# Patient Record
Sex: FEMALE | Race: WHITE
[De-identification: names, ages, dates, MRNs, and addresses within clinical notes are randomized per-mention and may not be internally consistent; named-entity substitution may affect disease eponyms.]

---

## 2016-06-03 VITALS
DIASTOLIC BLOOD PRESSURE: 66 MMHG | DIASTOLIC BLOOD PRESSURE: 66 MMHG | DIASTOLIC BLOOD PRESSURE: 66 MMHG | DIASTOLIC BLOOD PRESSURE: 66 MMHG | SYSTOLIC BLOOD PRESSURE: 136 MMHG | SYSTOLIC BLOOD PRESSURE: 136 MMHG | SYSTOLIC BLOOD PRESSURE: 136 MMHG | DIASTOLIC BLOOD PRESSURE: 66 MMHG | SYSTOLIC BLOOD PRESSURE: 136 MMHG | SYSTOLIC BLOOD PRESSURE: 136 MMHG

## 2017-08-30 ENCOUNTER — HOSPITAL ENCOUNTER (OUTPATIENT)
Dept: HOSPITAL 61 - MAMMO | Age: 67
Discharge: HOME | End: 2017-08-30
Attending: FAMILY MEDICINE
Payer: COMMERCIAL

## 2017-08-30 DIAGNOSIS — Z12.31: Primary | ICD-10-CM

## 2017-08-31 NOTE — RAD
DATE: 08/30/17



EXAM: DIGITAL SCREEN BILAT W/CAD



HISTORY: Routine screening



COMPARISON: None available. Reportedly patient's previous mammogram was 
performed more than 1012 years ago. This is a new baseline exam.



This study was interpreted with the benefit of Computerized Aided Detection (CAD
).



TECHNIQUE: Routine digital CC and MLO views of both breasts are obtained.



FINDINGS:



Breast Density: B



No suspicious clustered microcalcifications, focal asymmetric densities or 
masses are seen. The skin and nipples are intact. 



IMPRESSION: Negative exam



BI-RADS CATEGORY:BIRDS 1 (NEGATIVE EXAM)

RECOMMENDATION: ROUTINE SCREENING MAMMOGRAPHY, AS PER ACR GUIDELINES.



RECOMMENDED FOLLOW-UP: One year



PQRS compliance statement: Patient information was entered into a reminder 
system with a target due date for the next mammogram.



Mammography is a sensitive method for finding small breast cancers, but it does 
not detect them all and is not a substitute for careful clinical examination. A 
negative mammogram does not negate a clinically suspicious finding and should 
not result in delay in biopsying a clinically suspicious abnormality.



"Our facility is accredited by the [employer] Mammography 
Program."
[email or web address]

## 2018-01-02 ENCOUNTER — HOSPITAL ENCOUNTER (OUTPATIENT)
Dept: HOSPITAL 61 - CT | Age: 68
Discharge: HOME | End: 2018-01-02
Attending: FAMILY MEDICINE
Payer: COMMERCIAL

## 2018-01-02 DIAGNOSIS — R16.0: ICD-10-CM

## 2018-01-02 DIAGNOSIS — Z90.49: ICD-10-CM

## 2018-01-02 DIAGNOSIS — N28.1: Primary | ICD-10-CM

## 2018-01-02 DIAGNOSIS — K57.30: ICD-10-CM

## 2018-01-02 DIAGNOSIS — K83.8: ICD-10-CM

## 2018-01-02 PROCEDURE — 74177 CT ABD & PELVIS W/CONTRAST: CPT

## 2018-01-02 RX ADMIN — IOHEXOL 1 ML: 300 INJECTION, SOLUTION INTRAVENOUS at 09:59

## 2018-01-02 RX ADMIN — IOHEXOL 1 ML: 240 INJECTION, SOLUTION INTRATHECAL; INTRAVASCULAR; INTRAVENOUS; ORAL at 09:59

## 2018-10-31 ENCOUNTER — HOSPITAL ENCOUNTER (OUTPATIENT)
Dept: HOSPITAL 61 - MAMMO | Age: 68
Discharge: HOME | End: 2018-10-31
Attending: FAMILY MEDICINE
Payer: COMMERCIAL

## 2018-10-31 DIAGNOSIS — Z12.31: Primary | ICD-10-CM

## 2018-10-31 PROCEDURE — 77067 SCR MAMMO BI INCL CAD: CPT

## 2018-10-31 PROCEDURE — 77063 BREAST TOMOSYNTHESIS BI: CPT

## 2018-10-31 NOTE — RAD
DATE: 10/31/2018



EXAM: MAMMO CAMERON SCREENING BILATERAL



HISTORY: Routine screening



COMPARISON: 8/30/2017



This study was interpreted with the benefit of Computerized Aided Detection

(CAD).





Breast Density: SCATTERED The breast parenchyma shows scattered fibroglandular

densities. Breast parenchyma level B.





FINDINGS: 2-D and 3-D tomosynthesis imaging was performed in CC and MLO

projections.  No new or enlarging breast densities are seen.  Several benign

type calcifications are noted.  No suspicious microcalcifications have

developed.  





IMPRESSION: There is no mammographic evidence of malignancy in either breast.







BI-RADS CATEGORY: 2 BENIGN FINDING(S)



RECOMMENDED FOLLOW-UP: 12M 12 MONTH FOLLOW-UP



PQRS compliance statement: Patient information was entered into a reminder

system with a target due date     for the next mammogram.



Mammography is a sensitive method for finding small breast cancers, but it

does not detect them all and is not a substitute for careful clinical

examination.  A negative mammogram does not negate a clinically suspicious

finding and should not result in delay in biopsying a clinically suspicious

abnormality.



"Our facility is accredited by the [employer] Mammography

Program."

## 2019-11-05 ENCOUNTER — HOSPITAL ENCOUNTER (OUTPATIENT)
Dept: HOSPITAL 61 - MAMMO | Age: 69
Discharge: HOME | End: 2019-11-05
Attending: FAMILY MEDICINE
Payer: COMMERCIAL

## 2019-11-05 DIAGNOSIS — Z12.31: Primary | ICD-10-CM

## 2019-11-05 PROCEDURE — 77067 SCR MAMMO BI INCL CAD: CPT

## 2019-11-06 NOTE — RAD
DATE: 11/5/2019.



EXAM: DIGITAL SCREEN BILAT W/CAD.



HISTORY: Routine mammographic screening.



COMPARISON: 10/31/2018.



This study was interpreted with the benefit of Computerized Aided Detection

(CAD).



FINDINGS:



Breast Density:  SCATTERED The breast parenchyma shows scattered

fibroglandular densities. Breast parenchyma level B..



Scattered calcifications are benign. The parenchymal pattern is stable. There

are no suspicious masses, microcalcifications or architectural distortion.



BI-RADS CATEGORY: 2 BENIGN FINDING(S).



RECOMMENDED FOLLOW-UP: 12M 12 MONTH FOLLOW-UP.



PQRS compliance statement: Patient information was entered into a reminder

system with a target due date 11/5/2020 for the next mammogram.



Mammography is a sensitive method for finding small breast cancers, but it

does not detect them all and is not a substitute for careful clinical

examination.  A negative mammogram does not negate a clinically suspicious

finding and should not result in delay in biopsying a clinically suspicious

abnormality.



"Our facility is accredited by the [employer] Mammography

Program."

## 2020-11-19 ENCOUNTER — HOSPITAL ENCOUNTER (OUTPATIENT)
Dept: HOSPITAL 61 - MAMMO | Age: 70
End: 2020-11-19
Attending: FAMILY MEDICINE
Payer: COMMERCIAL

## 2020-11-19 DIAGNOSIS — Z12.31: Primary | ICD-10-CM

## 2020-11-19 PROCEDURE — 77067 SCR MAMMO BI INCL CAD: CPT

## 2020-11-19 PROCEDURE — 77063 BREAST TOMOSYNTHESIS BI: CPT

## 2020-11-23 NOTE — RAD
DATE: 11/19/2020 12:16 PM



EXAM: MAMMO CAMERON SCREENING BILATERAL



HISTORY:  Screening



COMPARISON: 11/5/2019



Bilateral CC and MLO views of the breasts were performed. Bilateral breast

tomosynthesis was performed in CC and MLO projections.



This study was interpreted with the benefit of Computerized Aided Detection

(CAD).





FINDINGS:



Breast Density: SCATTERED  The breast parenchyma shows scattered

fibroglandular densities. Breast parenchyma level B





No suspicious masses, microcalcifications or architectural distortion is

present to suggest malignancy in either breast.





The visualized axillae are unremarkable. 



IMPRESSION: No mammographic evidence of malignancy. 



BI-RADS CATEGORY: 1 NEGATIVE



RECOMMENDED FOLLOW-UP: 12M 12 MONTH FOLLOW-UP Annual screening mammography is

recommended, unless clinically indicated sooner based on symptoms or change in

physical exam.



PQRS compliance statement: Patient information was entered into a reminder

system with a target due date for the next mammogram.



Mammography is a sensitive method for finding small breast cancers, but it

does not detect them all and is not a substitute for careful clinical

examination.  A negative mammogram does not negate a clinically suspicious

finding and should not result in delay in biopsying a clinically suspicious

abnormality.



"Our facility is accredited by the [employer] Mammography

Program."

## 2021-12-15 ENCOUNTER — HOSPITAL ENCOUNTER (OUTPATIENT)
Dept: HOSPITAL 61 - MAMMO | Age: 71
End: 2021-12-15
Attending: FAMILY MEDICINE
Payer: COMMERCIAL

## 2021-12-15 DIAGNOSIS — Z12.31: Primary | ICD-10-CM

## 2021-12-15 PROCEDURE — 77063 BREAST TOMOSYNTHESIS BI: CPT

## 2021-12-15 PROCEDURE — 77067 SCR MAMMO BI INCL CAD: CPT

## 2021-12-15 NOTE — RAD
Bilateral digital screening 2-D and 3-D (digital breast tomosynthesis) mammogram:



Reason for examination: Routine screening.



Comparison:  Mammograms from 11/19/2020 and 11/5/2019.



Interpretation was made with the benefit of CAD.



FINDINGS:

Breast density: Category B. There are scattered areas of fibroglandular density.



No suspicious breast mass, malignant appearing calcifications, or architectural distortion is seen.



IMPRESSION:

No evidence of malignancy.



Assessment: BI-RADS 1. Negative.



Recommendation: Routine screening mammograms.



The patient will receive a letter with the results in the mail. Patient information will be entered i
nto the mammography reminder system with a target recall date for the next mammogram. A reminder brooke
er will be generated.



Electronically signed by: Alejandra Ojeda MD (12/15/2021 5:28 PM) [id number]